# Patient Record
Sex: FEMALE | Race: WHITE | NOT HISPANIC OR LATINO | Employment: FULL TIME | URBAN - METROPOLITAN AREA
[De-identification: names, ages, dates, MRNs, and addresses within clinical notes are randomized per-mention and may not be internally consistent; named-entity substitution may affect disease eponyms.]

---

## 2022-02-07 ENCOUNTER — TRANSCRIBE ORDERS (OUTPATIENT)
Dept: MATERNAL FETAL MEDICINE | Facility: CLINIC | Age: 32
End: 2022-02-07

## 2022-02-07 DIAGNOSIS — O26.90 PREGNANCY RELATED CONDITION, ANTEPARTUM: Primary | ICD-10-CM

## 2022-02-08 DIAGNOSIS — M32.14 LUPUS NEPHRITIS (H): ICD-10-CM

## 2022-02-08 DIAGNOSIS — L93.0 LUPUS ERYTHEMATOSUS: Primary | ICD-10-CM

## 2022-03-20 ENCOUNTER — HEALTH MAINTENANCE LETTER (OUTPATIENT)
Age: 32
End: 2022-03-20

## 2022-03-29 ENCOUNTER — PRE VISIT (OUTPATIENT)
Dept: MATERNAL FETAL MEDICINE | Facility: CLINIC | Age: 32
End: 2022-03-29
Payer: OTHER GOVERNMENT

## 2022-04-04 NOTE — PROGRESS NOTES
Maternal-Fetal Medicine Consultation    Olivia Jacobson  : 1990  MRN: [id number]    REFERRAL:  Olivia Jacobson is a 31 year old sent by Dr. Grande from [facility] for MFM consultation.    HPI:  Olivia Jacobson is a 31 year old  at 20w0d by 6w2d US here for MFM consultation for systemic lupus erythematosus with class III lupus nephritis.  Also with history of hypertension, BMI 38.  Also with A1C 5.7% in early pregnancy and history of fatty liver disease.    She is here with her partner.    Patient with history of SLE for which she follows with Dr. Sandoval of rheumatology at [facility].  This was diagnosed in  with inflammatory arthritis in the knees, shoulders, elbow, and PIP joints.  Also with serositis, fatigue, weight loss.  Positive FROYLAN.  She was started on Plaquenil in 2012 but discontinued shortly thereafter for a pruritic rash and swelling of her lips and eyelids.  Skin biopsy revealed a drug reaction, not a lupus rash.  This eventually cleared with Prednisone.  Also trialed on Imuran.  2014 she developed microscopic hematuria and proteinuria, TP/creatinine ratio 2.7, , C3 75, C4 <8 (chronically low C4), normal creatinine. At that time she was also noting pleuritic symptoms, and chest x-ray showed new bilateral pleural effusions. Renal biopsy 2014 with local proliferative lupus glomerulonephritis (activity index 5/24, chronicity index 0/12).  Started on Cellcept 500 mg bid but stopped shortly thereafter due to elevated liver transaminases in the 300s.  She completed 6 months of IV Cytoxan on 2014 with Lupron prophylaxis, IV Solu-Medrol, and a prolonged prednisone taper.  She started Prograf 1 mg BID in 2014 and ultimately tapered off Prednisone.  Prograf was increased due to recurrent eyelid swelling.  She was previously on Lisinopril however this has since been discontinued and BP remains normotensive.    She is currently on  "Tacrolimus 2 mg BID and has been well controlled; states this medication has been the most effective for her.  Last flare many years ago.  Lupus anticoagulant, beta 2 glycoprotein 1 antibody, and anticardiolipin negative (see labs below).  Per rheumatology records, negative SSA SSB.  In 2020 she did have a positive anticardiolipin IgG of 64 but this was the only time this has been positive.  She denies history of blood clots.  She denies photosensitivity, rashes, ocular inflammation, oral ulcers, sicca symptoms, alopecia, chest pain, shortness of breath, gastrointestinal complaints, Raynaud's, joint pain, swelling or prolonged morning stiffness.      Of note, first pregnancy was an ectopic requiring treatment with methotrexate in 10/2020.  Second pregnancy with missed  at 12 weeks gestation requiring D&C.    Of note, patient's outside records have a line that state \" screening for raised alphafetoprotein level\" however patient is unaware of an elevated AFP.  AFP 0.99 MoM and negative screen upon obtaining records.    Prenatal Care:  Primary OB care this pregnancy has been with [facility].    Obstetrics History:  OB History    Para Term  AB Living   3 0 0 0 2 0   SAB IAB Ectopic Multiple Live Births   1 0 1 0 0      # Outcome Date GA Lbr [place]/2nd Weight Sex Delivery Anes PTL Lv   3 Current            2 SAB 21 9w0d          1 Ectopic 10/08/20               Gynecologic History:  - Menstrual history: Patient's last menstrual period was 2021.   - Last Pap: 2022 NILM  - Denies any history of abnormal pap smears  - Denies prior cervical surgery or procedures  - Denies any history of frequent UTIs, vaginal infections, or STIs    Past Medical History:  Past Medical History:   Diagnosis Date     Elevated hemoglobin A1c      Fatty liver      HTN (hypertension)      Lupus nephritis (H)      Obesity      Systemic lupus erythematosus (H)        Past " Surgical History:  Past Surgical History:   Procedure Laterality Date     DILATION AND CURETTAGE         Current Medications:  Current Outpatient Medications   Medication Instructions     aspirin (ASA) 81 mg, Oral, DAILY     Prenatal Vit-Fe Fumarate-FA (PRENATAL VITAMINS PO) 1 tablet, Oral     tacrolimus (ENVARSUS XR) 4 mg, Oral, EVERY MORNING BEFORE BREAKFAST     VITAMIN D, CHOLECALCIFEROL, PO 1,000 Units, Oral, DAILY     Allergies:  Aminoquinolines rash    Social History:   Denies use of alcohol, drugs or smoking.    Family History:  Mother and sister with asthma.  Maternal grandmother and aunt with breast cancer.  Maternal grandmother with rheumatoid arthritis.    ROS:  As per HPI, otherwise negative.    PHYSICAL EXAM:  Deferred     Ultrasound:  DATING  ---------------------------------------------------------------------------------------------------------                                           Date                                Details                                                                                      Gest. age                      EDITA  LMP                                  11/8/2021                                                                                                                         21 w + 1 d                     8/15/2022  Prior assessment               12/29/2021                       GA: 6 w + 2 d                                                                            20 w + 1 d                     8/22/2022  U/S                                   4/5/2022                          based upon AC, BPD, Femur, HC                                                 21 w + 0 d                     8/16/2022  Assigned dating                  Dating performed on 04/5/2022, based on the prior assessment (on 12/29/2021)                    20 w + 1 d                     8/22/2022        GENERAL  EVALUATION  ---------------------------------------------------------------------------------------------------------  Cardiac activity present.  bpm.  Fetal movements present.  Presentation cephalic.  Placenta Placental site: posterior. No Previa, > 2 cm from internal os.  Umbilical cord 3 vessel cord.  Amniotic fluid Amount of AF: normal. MVP 4.0 cm.        FETAL BIOMETRY  ---------------------------------------------------------------------------------------------------------  Main Fetal Biometry:  BPD                                        49.6                    mm                         21w 0d                Hadlock  OFD                                        62.6                    mm                         20w 1d                Nicolaides  HC                                          180.3                  mm                          20w 3d                Hadlock  Cerebellum tr                            20.6                   mm                          19w 4d                Nicolaides  AC                                          159.1                  mm                          21w 0d        73%        Hadlock  Femur                                      36.1                   mm                          21w 3d                Hadlock  Humerus                                  33.8                    mm                         21w 3d                Nyasia  Fetal Weight Calculation:  EFW                                       402                     g                                     91%        Hadlock  EFW (lb,oz)                             0 lb 14                 oz  EFW by                                        Hadlock (BPD-HC-AC-FL)  Head / Face / Neck Biometry:  CM                                          4.7                     mm  Nasal bone                               6.5                     mm  Nuchal fold                               4.6                     mm        FETAL  ANATOMY  ---------------------------------------------------------------------------------------------------------  The following structures appear normal:  Head / Neck                         Cranium. Head size. Head shape. Lateral ventricles. Choroid plexus. Midline falx. Cavum septi pellucidi. Cerebellum. Cisterna magna.                                             Parenchyma. Thalami. Vermis.                                             Neck. Nuchal fold.  Face                                   Lips. Profile. Nose. Maxilla. Mandible. Orbits. Lens.  Heart / Thorax                      4-chamber view. Situs. Aortic arch view. Bicaval view. Ductal arch view. Superior vena cava. Inferior vena cava. 3-vessel-trachea view. Cardiac                                             position. Cardiac size. Cardiac rhythm.                                             Right lung. Left lung. Diaphragm.  Abdomen                             Abdominal wall. Cord insertion. Stomach. Kidneys. Bladder. Liver. Bowel. Genitals.  Spine                                  Cervical spine. Thoracic spine. Lumbar spine. Sacral spine.  Extremities / Skeleton          Right arm. Right hand. Left arm. Left hand. Right leg. Right foot. Left leg. Left foot.     The following structures could not be adequately visualized:  Heart / Thorax                      RVOT view. LVOT view. 3-vessel view.     Gender: male.        MATERNAL STRUCTURES  ---------------------------------------------------------------------------------------------------------  Cervix                                  Visualized                                             Appearance: Appears Closed                                             Cervical length 40.3 mm  Right Ovary                          Visualized  Left Ovary                            Visualized    Labs:   11/2021:  Creatinine 0.75  BUN 9  Na 139  K 4.3  GFR > 60  TP/Creatinine ratio:  0.05  Total protein urine 8  Total  creatinine urine 174  AST 16  ALT 26    21:  PT 12.6  INR 1.0  PTT 27.5  Thrombin 17.2  DRVVT 0.97, no evidence of lupus like anticoagulant   Beta 2 glycoprotein IgG 1.4, IgM 3.3, negative  Cardiolipin IgG 3.1, IgM 1.8, negative    21:  TP/Creatinine:  0.03    21 TSH 2.71    2021:  Anti DNA 12.2  Anti centromere <1:40  Homogeneous pattern <1:640  C4 21 WNL  C3 154 WNL  Anti SM <20  Anti RNP <20  Anti RO <20  Anti Scl 70 <20  Anti LA <20  Creatinine 0.58  Ast 10  Alt 13    2020:  Anti nuclear Ab negative  Anti-SM <20 negative  Anti RNP <20 negative  Anti RO <20 negative  Anti LA <20 negative  Anti Scl 70 <20 negative  Anti centromere <1:40  C3 169 elevated  C4 20 WNL  Anti thyroid microsomal antibody <9.0  Anti cardiolipin IgG 64 elevated  Anti cardiolipin IgA <12  Anti cardiolipin IgM <13  Anti chromatin 64 elevated  Anti DNA <8.0 negative  RF <15 negative  Anti CCP <20 negative  Homogeneous pattern <1:40    2019: Negative lupus anticoagulant, beta 2 glycoprotein 1 antibodies and anticardiolipin antibodies.       ASSESSMENT/PLAN:  Olivia Jacobson is a 31 year old  at 20w0d by 6w2d US here for MFM consultation for systemic lupus erythematosus with class III lupus nephritis.  Also with history of hypertension (now normotensive off of medication), fatty liver disease (recent normal LFTs), BMI 38.    Systemic lupus erythematosus  Class III lupus nephritis   The prognosis for both mother and child is best when SLE has been quiescent for at least 6 months prior to the pregnancy, and the patient's underlying renal function is stable and normal or near normal. The major issues with SLE are exacerbation of the disease, superimposed preeclampsia, fetal growth restriction, fetal loss, and  lupus. There may be a slight increase for flares during pregnancy, although the data are conflicting.   There is an increased risk of a lupus flare in the immediate postpartum period.       Patients with lupus nephritis require increased maternal and fetal surveillance as morbidity is greatly increased. Notably, the fetal loss rate may be as high as 50% if the baseline serum creatinine is >1.5.  The prognosis is favorable for nephritis patients if the disease is quiescent for 6 months, the serum creatinine is <1.5, proteinuria is <3g in a 24-hour collection, and the patient s blood pressure is well controlled.  However, 25% of these patients may develop renal deterioration and 8.5% may experience permanent renal damage.  These patients are at an increased risk for preeclampsia, which can be difficult to diagnose if there is chronic proteinuria and/or hypertension.    Another major issue is related to co-existing antibodies which lead to specific complications.  Antibodies to Ro and La are associated with fetal heart block, and antibodies associated with the antiphospholipid antibody syndrome (APS) are associated with poor obstetric outcomes and thrombosis.    It is sometimes difficult to differentiate an SLE flare from preeclampsia therefore baseline testing is important.      Data from several observational studies suggest that antimalarial drugs, such as hydroxycholorquine (Plaquenil), are safe and there is significant experience with their use in pregnancy. Glucocorticoids are also relatively safe though they are associated with hypertension, diabetes and  premature rupture of membranes. Importantly, SLE flares themselves are associated with poor outcome, and the risk of the medications is thought to be less than the risk of a flare itself.      Tacrolimus crosses the placenta and reported associations have included pregnancy loss,  delivery, low birth weight, birth defects (cardiac, neurologic, craniofacial, skeletal, renal),  renal dysfunction, and fetal distress. The limitations of knowing the exact risk include the fact that mother taking this medication often take  concomitant medications, and they typically have underlying medical concerns. There is not a causal relationship that has been found.  Small series have shown improved disease maintenance, thus the benefit is likely greater than any potential risk to the medication.     Recommendations:    Continue Tacrolimus with monthly level surveillance by rheumatologist     Low dose aspirin for pre-eclampsia prevention  - ECG, with echocardiogram if abnormal  - Thyroid function (TSH, reflex to free T4)    Previous negative workup for SSA, SSB, APAS with rheumatology     Serial ultrasound for fetal growth after 24 weeks    Frequent visits to assess blood pressure and urine protein    Weekly  testing with BPP (or NST and MVP) starting at 32 weeks    Delivery by 39-40 weeks    Stress dose steroids during labor if chronic steroids are necessary during the pregnancy.    Continue care with her rheumatologist, Dr. Sandoval    Chronic hypertension  Patients with chronic hypertension are more likely to develop preeclampsia during the pregnancy, with a risk of 20-50%.  Women with chronic hypertension are at increased risk for early-onset preeclampsia.  Low dose aspirin has been used to lower this risk.  Chronic hypertension also increases the risk of maternal stroke, pulmonary edema, renal failure, gestational diabetes, iatrogenic  birth, fetal growth restriction, placental abruption and  mortality rate including stillbirth    Without baseline laboratory assessment, it may be difficult to distinguish an exacerbation of hypertension from preeclampsia, especially in the third trimester. We reviewed that it is generally anticipated that blood pressure will gradually decrease during early pregnancy, reaching at speedy at 28-32 weeks, and then slowly rise to pre-pregnancy levels.     Guidelines suggest starting antihypertensive medication in women with chronic hypertension if the systolic blood pressure is persistently  160 mmHg or higher or the diastolic blood pressure is persistently 110 mmHg or higher.  If there is evidence of end organ damage (renal insufficiency, left ventricular hypertrophy or severe thrombocytopenia) a lower threshold of 150 mmHg systolic and/or 100 mm Hg diastolic is recommended. Treatment with antihypertensives is generally used to maintain blood pressure in the general range of 120-159 mmHg systolic and  mmHg diastolic. Lower blood pressures may increase the risk of fetal growth restriction. The primary reason for antihypertensive is to reduce the risk of maternal stroke. Recommended antihypertensives with studied safety profiles in pregnancy include nifedipine and labetalol. Unfortunately, even exquisite control of blood pressure does not reduce the risk of superimposed preeclampsia.     Recommendations:    Initiate necessary medications and titrate as needed to achieve blood pressure goal    Low dose aspirin for preeclampsia prevention, ideally started between 12-16 weeks    Close monitoring evidence of superimposed preeclampsia  o More frequent blood pressure monitoring; she should check her blood pressure at least weekly until 28 weeks at which point she should check it at least two times per week; if elevated or medication adjustments are needed, it should be taken more frequently  o Her blood pressure cuff should be titrated in the office  o More frequent prenatal visits are indicated with hypertension when medication modifications are necessary  o Calling parameters should be clearly laid out with regards to blood pressure and symptoms  o Repeat labs as clinically indicated, with a low threshold to rule-out superimposed preeclampsia, especially if it is thought that medication may need to be increased    Comprehensive (level II) anatomic ultrasound    Ultrasounds for growth monthly after comprehensive ultrasound     fetal surveillance with weekly BPP (or NST and MVP) starting at 32  weeks     Delivery at 37-39 weeks (unless poorly controlled or otherwise indicated sooner)    Early postpartum visit (within the first week) for blood pressure assessment    Postpartum, medication should be adjusted to maintain the systolic blood pressure below 150 mm Hg and the diastolic blood pressure below 100 mm Hg    Long-term care with primary care/cardiology    Postpartum lifestyle modifications if appropriate (e.g. if concurrent obesity/sedentary lifestyle)    Obesity  Obesity during pregnancy is associated with hypertension, preeclampsia, diabetes, venous thromboembolism, fetal macrosomia,  delivery, and complications of  delivery (excessive blood loss, infection, wound separation).  Obesity is associated with an increased risk of fetal anomalies, specifically neural tube defects.  This is further complicated by the decreased ability to detect malformations in these fetuses due to imaging limitations of maternal body habitus.  For women with class III obesity, detection of anomalies by targeted (level II) ultrasound is about 75% (as compared to 97% in women with a normal BMI).  By comparison, routine (level I) ultrasound would be expected to detect only 22% of anomalies in the class as compared to 66% of those with a normal weight.  Fundal heights may not be helpful in determining if there is appropriate fetal growth.  Studies suggest that pre-pregnancy obesity increases the risk for stillbirth, though the etiology is not known.  Pregnant women have an 11-20% incidence of obstructive sleep apnea, which is 13 times more common in women with obesity.  ENRRIQUE in pregnancy is associated with increased  morbidity and mortality, both fetal/ as well as maternal    There are a number of intrapartum considerations for obese women.  Studies have found that obese women have, on average a longer first stage of labor.   Obese women attempting a trial of labor after  are less likely  to be successful.  Pre-pregnancy obesity increases the rate of both emergent and scheduled  delivery.  Furthermore, obese women are more likely to have complications of  delivery.  Specifically, in obese women there are prolonged operative times which can delay an emergent delivery; they are more likely to have blood loss in excess of 1000 mL, wound infection/separation and endometritis.  There may be anesthesia concerns related to the obese  as well, such as a higher spinal failure rate and more difficult intubation.  Since obese women are more likely to have a macrosomic  they are more likely to have associated complications such as shoulder dystocia and obstetric sphincter injury.  There is also evidence that obese women are more likely to have prolonged postpartum hospitalizations and there is an increased risk for thromboembolism.      It is not recommended that obese women pursue weight loss during pregnancy but rather that they limit their weight gain.  Counseling by a nutritionist may be considered.  Screening for diabetes should be undertaken in the first trimester and then again at 26-28 weeks if the first testing is normal.  It is currently recommended that all pregnant women engage in 30 minutes of exercise most days of the week.  Additionally, postpartum weight loss and exercise should be emphasized as part of the lifestyle modifications that can alter morbidity and mortality.    Recommendations:    Screen for obstructive sleep apnea, using a questionnaire such as the STOP    Weight gain should be limited to <11-20 lbs    Counseling by a nutritionist may be considered.     Glucose testing with GCT should be performed as soon as possible when pregnancy is diagnosed; If the result is normal, the test should be repeated again at 26-28 weeks.    Targeted anatomical survey at 18 weeks.    Weekly  testing with BPP (or NST and MVP) starting at 34 weeks gestation, for  women with a BMI ? 40 and 37 weeks gestation, for women with a BMI ? 35.    Depending on BMI, consult anesthesiology on admission, or antepartum, depending on comorbidities, even if the patient is not planning to use anesthesia for labor, so they can assess her prior to an obstetric emergency    Induction of labor should be offered at 39 weeks gestation to women with a BMI ? 40, and can be considered at this GA for those with a BMI 30-39.      Use hospital available VTE risk stratification to determine need for anticoagulation (both for antepartum and postpartum admissions).  Briefly:  o All patients with a current BMI ? 40 should have pharmacologic thromboprophylaxis within 24 hours of their delivery for the course of their hospitalization regardless of the mode of delivery  o All patients with a current BMI ? 35 should have pharmacologic thromboprophylaxis within 24 hours of their delivery for the course of their hospitalization if they have a  delivery OR any other significant risk factor (including but not limited to SLE, diabetes with renal/cardiac disease, AMA 40+, QBL > 1000 mL, general anesthesia, hypertensive disorder, FGR, systemic infection, multiple gestation, tobacco, prolonged immobility > 24 hours).    o The same criteria apply to antepartum admissions; all patients with a current BMI ? 40 and all those with a current BMI ? 35 plus an additional risk factor    Encourage breastfeeding    Postpartum lifestyle modification      The patient was seen and evaluated with Dr. Nicolas    [facility] in 4 weeks for follow up comprehensive sono (limited views).    Thank you for allowing us to participate in the care of your patient. Please do not hesitate to contact us if you have further questions regarding the management of your patient.     Caroline Mayes MD   Maternal-Fetal Medicine Fellow, PGY5  2022 10:03 AM      Physician Attestation   I, Mode Nicolas MD, saw this patient and agree with the  findings and plan of care as documented in the note.      Items personally reviewed/procedural attestation: History and counseling. Total time spent in face-to-face counseling was 15 minutes (>50% for medical management discussion  And plan of care). A copy of this consult was sent to your office.     Mode Nicolas MD

## 2022-04-05 ENCOUNTER — HOSPITAL ENCOUNTER (OUTPATIENT)
Dept: ULTRASOUND IMAGING | Facility: CLINIC | Age: 32
Discharge: HOME | End: 2022-04-05
Attending: OBSTETRICS & GYNECOLOGY
Payer: OTHER GOVERNMENT

## 2022-04-05 ENCOUNTER — OFFICE VISIT (OUTPATIENT)
Dept: MATERNAL FETAL MEDICINE | Facility: CLINIC | Age: 32
End: 2022-04-05
Attending: OBSTETRICS & GYNECOLOGY
Payer: OTHER GOVERNMENT

## 2022-04-05 DIAGNOSIS — Z14.8 GENETIC CARRIER STATUS: Primary | ICD-10-CM

## 2022-04-05 DIAGNOSIS — L93.0 LUPUS ERYTHEMATOSUS: ICD-10-CM

## 2022-04-05 DIAGNOSIS — M32.14 LUPUS NEPHRITIS (H): ICD-10-CM

## 2022-04-05 DIAGNOSIS — O26.90 PREGNANCY RELATED CONDITION, ANTEPARTUM: ICD-10-CM

## 2022-04-05 DIAGNOSIS — D68.62 LUPUS ANTICOAGULANT AFFECTING PREGNANCY IN SECOND TRIMESTER, ANTEPARTUM (H): Primary | ICD-10-CM

## 2022-04-05 DIAGNOSIS — O99.112 LUPUS ANTICOAGULANT AFFECTING PREGNANCY IN SECOND TRIMESTER, ANTEPARTUM (H): Primary | ICD-10-CM

## 2022-04-05 DIAGNOSIS — Z36.9 ANTENATAL SCREENING ENCOUNTER: ICD-10-CM

## 2022-04-05 PROCEDURE — 96040 HC GENETIC COUNSELING, EACH 30 MINUTES: CPT

## 2022-04-05 PROCEDURE — 76811 OB US DETAILED SNGL FETUS: CPT | Mod: 26 | Performed by: OBSTETRICS & GYNECOLOGY

## 2022-04-05 PROCEDURE — G0463 HOSPITAL OUTPT CLINIC VISIT: HCPCS | Mod: 25 | Performed by: OBSTETRICS & GYNECOLOGY

## 2022-04-05 PROCEDURE — 76811 OB US DETAILED SNGL FETUS: CPT

## 2022-04-05 PROCEDURE — 99202 OFFICE O/P NEW SF 15 MIN: CPT | Mod: 25 | Performed by: OBSTETRICS & GYNECOLOGY

## 2022-04-05 RX ORDER — ASPIRIN 81 MG/1
81 TABLET, CHEWABLE ORAL DAILY
Status: ON HOLD | COMMUNITY
End: 2022-08-14

## 2022-04-05 NOTE — PROGRESS NOTES
"[facility] [place]  Genetic Counseling Consult    Patient: Olivia Jacobson YOB: 1990       Olivia \"Rubi\" MATTHEW Jacobson was seen at the [facility] [place] for genetic consultation as part of her appointment for comprehensive ultrasound.  Olivia was accompanied to the appointment today by her partner, Aldo.         Impression/Plan:   1. 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 She and her partner Aldo declined carrier screening for Aldo at this time. They are aware this option will remain available if they are interested at any point.     2. Rubi had a cell-free fetal DNA test earlier in pregnancy, which was normal.    3. Rubi had a comprehensive (level II) ultrasound today.  Please see the ultrasound report for further details.    4. Rubi also had a consultation with [facility] physician today due to her history of systemic lupus erythematous. Please see Dr. Nicolas's note for details.      Pregnancy History:   /Parity:    Age at Delivery: 32 year old  EDITA: 2022, by Ultrasound  Gestational Age: 20w1d    No significant complications or exposures were reported in the current pregnancy.    Rubi s pregnancy history is significant for:  o 10/2020: Ectopic pregnancy, ruptured tube treated with salpingectomy  o 2021: SAB, 9w0d, s/p D&C    Medical History:   Rubi's medical history is significant for systemic lupus erythematous, for which she currently takes tacrolimus 2 mg BID. She was diagnosed in  and follows with Dr. Costa at [facility]. She was last seen by Dr. Costa on 12/15/2021 and has a follow-up appointment scheduled 2022. Rubi states that her joint symptoms have been under good control during the pregnancy. Please see Dr. Estrada's note for additional details and recommendations.        Family History:   A three-generation pedigree was obtained, and is scanned under the "  Media  tab.   The following significant findings were reported by Rubi and Aldo:    The father of the pregnancy, Aldo, is 32 and healthy.    Aldo reports that his paternal uncle has autism. It was reported that he has no other health concerns or physical differences.   o Autism is a spectrum of developmental disorders characterized by impaired social interaction, problems with verbal and nonverbal communication, and unusual, repetitive, or severely limited activities and interests.  Autism is a heterogeneous disorder, including genetic and non-genetic causes. In a small percentage of individuals with ASD, it is a feature of a genetic condition such as Down syndrome, fragile X syndrome, or Rett syndrome. However, in most isolated cases the cause may be multifactorial, meaning a combination of genetic and environmental factors. Given that this is a third degree relative to the pregnancy, risk of autism spectrum disorders may be slightly increased over that of the general population for Rubi's pregnancy. Prenatal testing to determine risk of autism is not available. Recommended discussing this family history with their pediatrician to ensure appropriate screening for autism spectrum disorders.     Aldo reports that a maternal cousin had breast cancer at age 45 and a maternal aunt had breast cancer at age 65. Rubi reports that a maternal aunt passed away from breast cancer at age 45. Additionally, Rubi's father was recently diagnosed with melanoma on his torso at age 61 and just completed treatment. Rubi has been advised to schedule a full body skin exam due to this history and plans to do this in the near future.   o We briefly discussed the family history of cancer. We discussed that most cancer seen in families occurs sporadically, but about 5-10% may be due to an underlying genetic etiology. We discussed that breast cancer before age 50 is rare and can be associated with inherited cancer predisposition  "syndromes. Genetic counseling is available for cancer syndromes. Cancer family history, even without genetic testing, can change cancer screening recommendations for family members and aid in insurance coverage for access to them as well. The most informative individuals to complete cancer genetic counseling and genetic testing are those with a personal history of cancer or those closely related to the affected individuals. If the family wants more information they can contact the [facility] Cancer Risk Management Program ([phone]). Physicians can also make referrals at [email or web address] or, if within the [place] system, through [facility] referral for \"Cancer Risk Mgmt/Cancer Genetic Counseling\"   o Also discussed that individuals with a first degree relative with melanoma may be at increased risk of melanoma. Encouraged Rubi to schedule a skin exam.     Otherwise, the reported family history is negative for multiple miscarriages, stillbirths, birth defects, intellectual disability, known genetic conditions, and consanguinity.       Carrier Screening:   The patient reports that she and the father of the pregnancy are of  ancestry:     Cystic fibrosis is an autosomal recessive genetic condition that occurs with increased frequency in individuals of  ancestry and carrier screening for this condition is available.  In addition,  screening in the [facility] includes cystic fibrosis.    The father of the pregnancy has a small amount of Mediterranean ancestry:     The hemoglobinopathies are a group of genetic blood diseases that occur with increased frequency in individuals of Mediterranean ancestry and carrier screening for these conditions is available. In addition,  screening in the [facility] includes many of the hemoglobinopathies.      Expanded carrier screening for mutations in a large panel of genes " associated with autosomal recessive conditions including cystic fibrosis, spinal muscular atrophy, and others, is now available.      Rubi had carrier screening for 51 genes through [employer] earlier in the pregnancy. This was positive for a pathogenic variant in alpha-1 antitrypsin deficiency (SERPINA1 c.1096G>A ([id number]), also known as the Z allele). Her carrier screen was negative for the remainder of the genes on the panel.    Alpha-1-antitrypsin deficiency is a condition inherited in an autosomal codominant fashion that can cause lung disease (COPD and emphysema) and liver disease. It is caused by variations in the genetic sequence of the gene SERPINA1 (on chromosome 14). There are a number of possible genotypes which ultimately contribute to the severity of the disorder in individuals: Z/Z being the most severe due to the lack off alpha-1-antitrypsin made by the body; S/S being a moderate form of the disease due to some production of alpha-1-antitrypsin; M/M unaffected status due to wild-type amount of alpha-1-antitrypsin produced. There are also a variety of combinations for these allele types that can result in varying degrees of clinical outcomes (ie. Individuals with S/Z or M/S).    We reviewed availability of carrier screening for Aldo to get more information about the chance that the current pregnancy is affected with this condition. Rubi and Aldo shared that they have had a chance to read about this condition and have decided that they don't feel that it is necessary to pursue carrier screening for Aldo for this condition at this time. They were encouraged to share Rubi's carrier status with their child's pediatrician.       Risk Assessment:       Olivia had maternal serum screening earlier in pregnancy.     Non-invasive Prenatal Testing (NIPT)    Maternal plasma cell-free DNA testing    Screens for fetal trisomy 21, trisomy 13, trisomy 18, and sex chromosome aneuploidy    First trimester  ultrasound with nuchal translucency and nasal bone assessment was not performed in this pregnancy, to our knowledge.    Olivia had an [employer] NIPS test earlier in pregnancy; we reviewed the results today, which are normal for chromosome 13, chromosome 18, chromosome 21, and sex chromosomes (no aneuploidy detected)    Given the accuracy of this test, these results greatly decrease the chance for certain fetal chromosome abnormalities    We discussed the limitations of normal NIPT results    MSAFP (after 15 weeks for open neural tube defect screening) results were not available for our review today.       Testing Options:   We discussed the following options:   Genetic Amniocentesis    Invasive procedure typically performed in the second trimester by which amniotic fluid is obtained for the purpose of chromosome analysis and/or other prenatal genetic analysis    Diagnostic results; >99% sensitivity for fetal chromosome abnormalities    AFAFP measurement tests for open neural tube defects     Comprehensive (Level II) ultrasound: Detailed ultrasound performed between 18-22 weeks gestation to screen for major birth defects and markers for aneuploidy.    We reviewed the benefits and limitations of this testing.  Screening tests provide a risk assessment specific to the pregnancy for certain fetal chromosome abnormalities, but cannot definitively diagnose or exclude a fetal chromosome abnormality.  Follow-up genetic counseling and consideration of diagnostic testing is recommended with any abnormal screening result.     Diagnostic tests carry inherent risks- including risk of miscarriage- that require careful consideration.  These tests can detect fetal chromosome abnormalities with greater than 99% certainty.  Results can be compromised by maternal cell contamination or mosaicism, and are limited by the resolution of cytogenetic G-banding technology.  There is no screening nor diagnostic test that can detect all forms of  birth defects or mental disability.     It was a pleasure to be involved with Olivia s care. Face-to-face time of the meeting was 30 minutes.    Felecia Fox [facility] [place] [facility], [facility]  Licensed Genetic Counselor  [facility]  Maternal Fetal Medicine  Phone: [phone]  [email or web address]

## 2022-04-05 NOTE — NURSING NOTE
Rubi here for GC, comp U/S and MFM consult due to preg c/b Lupus and elevated BMI. Dr. Nicolas and Dr. Mayes in to see pt.  See consult note. Pt left amb and stable. Amaya Marx RN

## 2022-04-18 ENCOUNTER — HOME INFUSION (PRE-WILLOW HOME INFUSION) (OUTPATIENT)
Dept: PHARMACY | Facility: CLINIC | Age: 32
End: 2022-04-18
Payer: OTHER GOVERNMENT

## 2022-04-19 NOTE — PROGRESS NOTES
This is a recent snapshot of the patient's [facility] medical record.  For current drug dose and complete information and questions, call [phone] or In [facility] pool, fv home infusion ([id number])  CSN Number:  [id number]

## 2022-04-25 ENCOUNTER — MYC MEDICAL ADVICE (OUTPATIENT)
Dept: FAMILY MEDICINE | Facility: CLINIC | Age: 32
End: 2022-04-25

## 2022-04-25 ENCOUNTER — VIRTUAL VISIT (OUTPATIENT)
Dept: FAMILY MEDICINE | Facility: CLINIC | Age: 32
End: 2022-04-25
Payer: OTHER GOVERNMENT

## 2022-04-25 DIAGNOSIS — U07.1 INFECTION DUE TO 2019 NOVEL CORONAVIRUS: Primary | ICD-10-CM

## 2022-04-25 DIAGNOSIS — R05.9 COUGH: ICD-10-CM

## 2022-04-25 PROCEDURE — 99203 OFFICE O/P NEW LOW 30 MIN: CPT | Mod: TEL | Performed by: PHYSICIAN ASSISTANT

## 2022-04-25 RX ORDER — PREDNISONE 20 MG/1
40 TABLET ORAL DAILY
Qty: 10 TABLET | Refills: 0 | Status: SHIPPED | OUTPATIENT
Start: 2022-04-25 | End: 2022-04-30

## 2022-04-25 NOTE — PROGRESS NOTES
Rubi is a 31 year old who is being evaluated via a billable telephone visit.      How would you like to obtain your AVS? MyChart  If the video visit is dropped, the invitation should be resent by: Text to cell phone: [phone]  Will anyone else be joining your video visit? No    Video Start Time: 4:08 PM    Assessment & Plan     Infection due to 2019 novel coronavirus  Cough  Patient is a 31-year-old female with past medical history significant for fatty liver, lupus nephritis, SLE, is currently 23 weeks pregnant, who presents to clinic due to COVID-19 symptoms starting approximately 1 week ago with positive test at that time.  Patient was treated with monoclonal antibodies.  She notes persistent symptoms including dry cough that comes in fits and wakes her up at night.  Given medical history and COVID-19 diagnosis, considered PE, but patient denies chest pain outside of coughing, dyspnea on exertion, dyspnea at rest, and hemoptysis.  She has been compliant with 81 mg aspirin daily.  Discussed treatment for cough and recommended starting with guaifenesin.  Patient will  over-the-counter.  If symptoms are not improving with medicine, patient prescribed prednisone.  Discussed symptoms that warrant urgent/emergent follow-up as well as return precautions.  - predniSONE (DELTASONE) 20 MG tablet; Take 2 tablets (40 mg) by mouth daily for 5 days     See Patient Instructions    No follow-ups on file.    Ashanti Jamil PA-C  [facility] MEL Venegas is a 31 year old who presents for the following health issues     HPI       COVID-19 Symptom Review  How many days ago did these symptoms start? Positive Covid19 test completed 4/18, sx began that same day.  Patient received monoclonal antibody treatment the next day.  Symptoms have not really improved since last week. Patient notes persistent cough, coughing attacks and chest pain that is only with cough. No wheezing or dyspnea. No  hemoptysis. Patient takes 81mg ASA during pregnancy. No history of PE/DVT.     Are any of the following symptoms significant for you?  New or worsening difficulty breathing? Yes    Please describe what kind of difficulty you are having breathing:Mild dyspnea (able to do ADLs without difficulty, mild shortness of breath with activities such as climbing one or two flights of stairs or walking briskly)    Worsening cough? Yes, I am coughing up mucus-yellow-white    Fever or chills? No    Headache: no    Sore throat: YES    Chest pain: YES    Diarrhea: no    Body aches? no    What treatments has patient tried? None   Does patient live in a nursing home, group home, or shelter? no  Does patient have a way to get food/medications during quarantined? Yes, I have a friend or family member who can help me.         MASSBP Score 4/25/2022   Age Greater than or equal to 65 years 0   BMI greater than or equal to 35 kg/m2 2   Has Diabetes Mellitus 0   Has Chronic Kidney Disease 3   Has Cardiovascular Disease and 55 years or older 0   Has Chronic Respiratory Disease and 55 years or older 0   Has Hypertension and 55 years or older 0   Is Immunocompromised 4   Is Pregnant 4   Member of [employer] community (Black/, /, ,  or , or  or [place] Native)  0   MASSBP Score 13   Has the patient had a positive COVID test outside our system?  Yes   What day did symptoms start?  4/18/2022             Objective           Vitals:  No vitals were obtained today due to virtual visit.    Physical Exam   GENERAL: Healthy, alert and no distress  EYES: Eyes grossly normal to inspection.  No discharge or erythema, or obvious scleral/conjunctival abnormalities.  RESP: Intermittent dry cough.  Able to speak in full sentences.  SKIN: Visible skin clear. No significant rash, abnormal pigmentation or lesions.  NEURO: Cranial nerves grossly intact.  Mentation and speech appropriate  for age.  PSYCH: Mentation appears normal, affect normal/bright, judgement and insight intact, normal speech and appearance well-groomed.          Video-Visit Details    Type of service:  Telephone Visit    Video End Time:4:28 PM    Originating Location (pt. Location): Home    Distant Location (provider location):  [facility]     Platform used for Video Visit: Unable to complete video visit

## 2022-04-25 NOTE — PATIENT INSTRUCTIONS
To help with cough y I would recommend starting with over-the-counter guaifenesin.  If your symptoms do not improve over the next few days, you can start prescribed prednisone.  Both of these medications are safe to use in pregnancy.  As discussed, if you develop any severe symptoms such as chest pain outside of coughing, shortness of breath, shortness of breath with activity, high fevers, or coughing up blood, you need to be seen in the emergency department right away.  Please reach out with questions or concerns.  Discharge Instructions for COVID-19 Patients  You were tested for COVID-19. Your result was positive. This means you do have COVID-19 (coronavirus). Follow the instructions below. If you were tested for an upcoming procedure, you should also contact your provider for next steps.   Is there medicine to treat COVID-19?  Yes, there are two kinds of treatment: Antiviral (virus-killing) medicine and antibody treatment (called monoclonal antibodies). These have been proven safe and effective. They may make you feel better faster, keep you out of the hospital, and prevent death.   It's very important to take them early in your illness before you get worse.   Who should take this medicine?   These treatments are for people who are not in the hospital, but they're at risk of getting very sick from COVID. This includes people who:  Are over age 65  Are members of the BIPOC community (black, indigenous and people of color)  Are overweight (body mass index is over 25)  Are inactive (don't exercise)  Are pregnant  Smoke or vape (now or in the past)  Have a disability  Have any of the following health problems: Diabetes, high blood pressure, cancer, heart problems, liver disease, lung disease, kidney disease, sickle cell disease, cystic fibrosis (CF), dementia and other neurological (brain) diseases, HIV, thalassemia or tuberculosis  Have ever had a stroke, organ transplant or blood cell transplant  Have a mental  "health problem or substance abuse disorder (drugs, alcohol)  Have a weak immune system (are immuno-compromised)  If your risk is high, we strongly urge you to get treated as soon as possible.   If symptoms started in the last 5 days: You may qualify for pills (antiviral medicines like Paxlovid and molnupiravir). To schedule an appointment to discuss COVID-19 treatment, you can:  Call your family clinic. Or, dial [phone] ([phone]) and press [phone].  Go to [email or web address] (click \"Schedule your appointment\").  Request an appointment on [employer] (select COVID-19 Treatment\").  If your symptoms started in the last 7 days: You may qualify for antibody shots. Fill out a request form at [email or web address]. (If you don't read and write in English, or you need help with the form, call your family clinic.) Not everyone is chosen for this treatment. If you're selected, someone will contact you within 1 to 2 business days.  How can I take care of myself at home?  Get lots of rest.  Drink extra fluids (unless a doctor has told you not to).  Take acetaminophen (Tylenol) for fever or pain. (If you have liver or kidney problems, first ask your family doctor if it's safe to take acetaminophen.  Adults may take either:  650 mg acetaminophen (two 325 mg pills) every 4 to 6 hours as needed (but no more than 10 pills per day), or   1,000 mg acetaminophen (two 500 mg pills) every 6 hours as needed (but no more than 6 pills per day).  Note: Don't take more than 3,000 mg of acetaminophen (Tylenol) in one day. Acetaminophen is found in many medicines (both prescribed and over-the-counter medicines). Read all labels to be sure you don't take too much.  For children:  Check the acetaminophen (Tylenol) bottle to find out the right dose (based on their age or weight.)  Don't give children more than1,625 mg of acetaminophen in one day.  Know when to call 911. Emergency warning signs " include:  Trouble breathing or shortness of breath  Pain or pressure in the chest that doesn't go away  Feeling confused like you haven't felt before, or not being able to wake up  Bluish-colored lips or face  If you have other health problems (like cancer, heart failure, an organ transplant or severe kidney disease): Call your specialty clinic if you don't feel better in the next 2 days.  How can I protect others?  If you DO have symptoms:  Stay home and away from others (self-isolate):  For at least 5 days after your symptoms started, AND UNTIL  You've had no fever for 24 hours--with no medicine that reduces fever, AND  Your other symptoms (such as a cough) are better.  Wear a mask or face covering for 10 full days anytime you're around other people.  If you DON'T have symptoms:  Stay at home and away from others   for at least 5 days after your positive test.  Wear a mask or face covering for 10 full days anytime you're around other people.  If you plan to visit a clinic or hospital, please check their visitor guidelines before you arrive--healthcare sites may have different rules. If you were tested because you're going to have surgery or another procedure, contact your care team for next steps.  If you were severely ill from COVID-19 or have a weak immune system: stay at home and away from others for at least 10 days. Ask your doctor about other actions you should take.   During self-isolation  Stay home until it's safe to be around others.  At home, stay away from other people and pets. Or, wear a well-fitting mask when you need to be around others.  Monitor your symptoms. If you have any emergency warning signs (including trouble breathing), seek emergency medical care right away.  Stay in a separate room from other household members, if possible.  Use a separate bathroom, if possible.  Improve ventilation at home, if possible.  Don't share personal household items, like cups, towels, and utensils.  When can  I go back to work?  You should NOT go back to work until you meet the guidelines above for ending your home isolation. You don't need to be re-tested for COVID-19 before going back to work. Studies show that you won't spread the virus if it's been at least   10 days since your symptoms started (or 20 days if you have a weak immune system).  Employers, schools and daycares: This document serves as formal notice of medical guidelines before your employee or student can return to work or school. They must meet the above guidelines before going back in person.   Where can I get more information?  [facility] - About COVID-19:   [email or web address]  [facility] - What to Do If You're Sick:   www.cdc.gov/coronavirus/2019-ncov/about/steps-when-sick.html  CDC - Ending Home Isolation:   www.cdc.gov/coronavirus/2019-ncov/your-health/quarantine-isolation.html  [facility] Clinical trials (COVID-19 research studies):  clinicalaffairs.[facility]-clinical-trials    For informational purposes only. Not to replace the advice of your health care provider. Clinically reviewed by Dr. Drew Mills.   Copyright   2020 [facility]. All rights reserved. [employer] 903230 - REV 03/10/22.

## 2022-04-25 NOTE — LETTER
April 25, 2022      Olivia Jacobson  [address] [address]        To Whom It May Concern:    Olivia Jacobson was seen in our clinic. She may return to work 5/2/2022 without restrictions.      Sincerely,        Ashanti Jamil PA-C

## 2022-04-29 ENCOUNTER — E-VISIT (OUTPATIENT)
Dept: FAMILY MEDICINE | Facility: CLINIC | Age: 32
End: 2022-04-29
Payer: OTHER GOVERNMENT

## 2022-04-29 DIAGNOSIS — R05.9 COUGH: ICD-10-CM

## 2022-04-29 DIAGNOSIS — U07.1 INFECTION DUE TO 2019 NOVEL CORONAVIRUS: ICD-10-CM

## 2022-04-29 PROCEDURE — 99207 PR NON-BILLABLE SERV PER CHARTING: CPT | Performed by: PHYSICIAN ASSISTANT

## 2022-05-02 NOTE — PATIENT INSTRUCTIONS
Dear Olivia Jacobson,    We are sorry you are not feeling well. Based on the responses you provided, it is recommended that you be seen in-person in urgent care so we can better evaluate your symptoms.  If you have any severe symptoms such as chest pain, difficulty breathing, or coughing up blood, that you need to go to the emergency department.  Please click here to find the nearest urgent care location to you.   You will not be charged for this Visit. Thank you for trusting us with your care.    Ashanti Jamil PA-C

## 2022-05-04 ENCOUNTER — HOSPITAL ENCOUNTER (OUTPATIENT)
Dept: ULTRASOUND IMAGING | Facility: CLINIC | Age: 32
Discharge: HOME | End: 2022-05-04
Attending: OBSTETRICS & GYNECOLOGY
Payer: OTHER GOVERNMENT

## 2022-05-04 ENCOUNTER — OFFICE VISIT (OUTPATIENT)
Dept: MATERNAL FETAL MEDICINE | Facility: CLINIC | Age: 32
End: 2022-05-04
Attending: OBSTETRICS & GYNECOLOGY
Payer: OTHER GOVERNMENT

## 2022-05-04 DIAGNOSIS — O10.919 CHRONIC HYPERTENSION IN PREGNANCY: ICD-10-CM

## 2022-05-04 DIAGNOSIS — M32.9 SYSTEMIC LUPUS ERYTHEMATOSUS AFFECTING PREGNANCY IN SECOND TRIMESTER (H): Primary | ICD-10-CM

## 2022-05-04 DIAGNOSIS — M32.14 LUPUS NEPHRITIS (H): ICD-10-CM

## 2022-05-04 DIAGNOSIS — O99.891 SYSTEMIC LUPUS ERYTHEMATOSUS AFFECTING PREGNANCY IN SECOND TRIMESTER (H): Primary | ICD-10-CM

## 2022-05-04 PROCEDURE — 76816 OB US FOLLOW-UP PER FETUS: CPT | Mod: 26 | Performed by: OBSTETRICS & GYNECOLOGY

## 2022-05-04 PROCEDURE — 76816 OB US FOLLOW-UP PER FETUS: CPT

## 2022-08-12 ENCOUNTER — HOSPITAL ENCOUNTER (OUTPATIENT)
Facility: CLINIC | Age: 32
End: 2022-08-12
Admitting: OBSTETRICS & GYNECOLOGY
Payer: OTHER GOVERNMENT

## 2022-08-12 ENCOUNTER — HOSPITAL ENCOUNTER (OUTPATIENT)
Facility: CLINIC | Age: 32
Discharge: HOME | End: 2022-08-12
Attending: OBSTETRICS & GYNECOLOGY | Admitting: OBSTETRICS & GYNECOLOGY
Payer: OTHER GOVERNMENT

## 2022-08-12 VITALS
SYSTOLIC BLOOD PRESSURE: 145 MMHG | OXYGEN SATURATION: 97 % | DIASTOLIC BLOOD PRESSURE: 100 MMHG | WEIGHT: 227 LBS | TEMPERATURE: 98.3 F | RESPIRATION RATE: 16 BRPM | HEIGHT: 63 IN | HEART RATE: 112 BPM | BODY MASS INDEX: 40.22 KG/M2

## 2022-08-12 PROBLEM — Z36.89 ENCOUNTER FOR TRIAGE IN PREGNANT PATIENT: Status: ACTIVE | Noted: 2022-08-12

## 2022-08-12 LAB
ABO/RH(D): NORMAL
ALBUMIN MFR UR ELPH: <7 MG/DL
ALT SERPL W P-5'-P-CCNC: 11 U/L (ref 0–45)
ANTIBODY SCREEN: NEGATIVE
AST SERPL W P-5'-P-CCNC: 12 U/L (ref 0–40)
CREAT SERPL-MCNC: 0.66 MG/DL (ref 0.6–1.1)
CREAT UR-MCNC: 52 MG/DL
ERYTHROCYTE [DISTWIDTH] IN BLOOD BY AUTOMATED COUNT: 14.6 % (ref 10–15)
GFR SERPL CREATININE-BSD FRML MDRD: >90 ML/MIN/1.73M2
HCT VFR BLD AUTO: 36.8 % (ref 35–47)
HGB BLD-MCNC: 12.6 G/DL (ref 11.7–15.7)
MCH RBC QN AUTO: 30.7 PG (ref 26.5–33)
MCHC RBC AUTO-ENTMCNC: 34.2 G/DL (ref 31.5–36.5)
MCV RBC AUTO: 90 FL (ref 78–100)
PLATELET # BLD AUTO: 181 10E3/UL (ref 150–450)
PROT/CREAT 24H UR: NORMAL MG/G{CREAT}
RBC # BLD AUTO: 4.1 10E6/UL (ref 3.8–5.2)
SPECIMEN EXPIRATION DATE: NORMAL
WBC # BLD AUTO: 8.1 10E3/UL (ref 4–11)

## 2022-08-12 PROCEDURE — 84460 ALANINE AMINO (ALT) (SGPT): CPT | Performed by: OBSTETRICS & GYNECOLOGY

## 2022-08-12 PROCEDURE — 84156 ASSAY OF PROTEIN URINE: CPT | Performed by: OBSTETRICS & GYNECOLOGY

## 2022-08-12 PROCEDURE — 84450 TRANSFERASE (AST) (SGOT): CPT | Performed by: OBSTETRICS & GYNECOLOGY

## 2022-08-12 PROCEDURE — 36415 COLL VENOUS BLD VENIPUNCTURE: CPT | Performed by: OBSTETRICS & GYNECOLOGY

## 2022-08-12 PROCEDURE — 85027 COMPLETE CBC AUTOMATED: CPT | Performed by: OBSTETRICS & GYNECOLOGY

## 2022-08-12 PROCEDURE — G0463 HOSPITAL OUTPT CLINIC VISIT: HCPCS

## 2022-08-12 PROCEDURE — 82565 ASSAY OF CREATININE: CPT | Performed by: OBSTETRICS & GYNECOLOGY

## 2022-08-12 PROCEDURE — 86850 RBC ANTIBODY SCREEN: CPT | Performed by: OBSTETRICS & GYNECOLOGY

## 2022-08-12 RX ORDER — LIDOCAINE 40 MG/G
CREAM TOPICAL
Status: DISCONTINUED | OUTPATIENT
Start: 2022-08-12 | End: 2022-08-12 | Stop reason: HOSPADM

## 2022-08-12 RX ORDER — SODIUM CHLORIDE, SODIUM LACTATE, POTASSIUM CHLORIDE, CALCIUM CHLORIDE 600; 310; 30; 20 MG/100ML; MG/100ML; MG/100ML; MG/100ML
10-125 INJECTION, SOLUTION INTRAVENOUS CONTINUOUS
Status: DISCONTINUED | OUTPATIENT
Start: 2022-08-12 | End: 2022-08-12 | Stop reason: HOSPADM

## 2022-08-12 ASSESSMENT — ACTIVITIES OF DAILY LIVING (ADL)
ADLS_ACUITY_SCORE: 31
ADLS_ACUITY_SCORE: 35

## 2022-08-12 NOTE — DISCHARGE INSTRUCTIONS
OB Triage Discharge Instructions    Diet:  Regular diet as tolerated    Activity:  As tolerated    Other Special Instructions: Please call [facility] Labor and Delivery on Sunday 8/14/2022 around 2:30-2:45 pm to see if your induction will occur as scheduled. Plan on arriving to the labor and delivery unit at 4PM.      Reason for being seen in L&D: Elevated blood pressures.     Treatment/procedures performed in L&D: Serial blood pressures, HELLP panel.     Call the [facility] at  [phone] if you have:  5 or more contractions in one hour  Leaking of fluid from your vaginal area  Decreased fetal movement (follow kick count instructions)  Bleeding from your vaginal area  Swelling in your face, or increased swelling in your hands or legs  Headaches or vision problems such as blurring or seeing spots or starts  Nausea or vomiting lasting for more than 24 hours  An increase in weight (5lbs/week)  Epigastric pain (sometimes confused with heartburn that does not go away or a very bad stomach ache)    If you have any questions, please follow up with your doctor.      Method of discharge: ambulatory   Accompanied by: self  Time of discharge: 1225

## 2022-08-15 ENCOUNTER — ANESTHESIA (OUTPATIENT)
Dept: OBGYN | Facility: CLINIC | Age: 32
End: 2022-08-15
Payer: OTHER GOVERNMENT

## 2022-08-15 ENCOUNTER — ANESTHESIA EVENT (OUTPATIENT)
Dept: OBGYN | Facility: CLINIC | Age: 32
End: 2022-08-15
Payer: OTHER GOVERNMENT

## 2022-08-15 PROBLEM — O98.512 COVID-19 AFFECTING PREGNANCY IN SECOND TRIMESTER: Status: ACTIVE | Noted: 2022-08-15

## 2022-08-15 PROBLEM — U07.1 COVID-19 AFFECTING PREGNANCY IN SECOND TRIMESTER: Status: ACTIVE | Noted: 2022-08-15

## 2022-08-15 PROBLEM — O99.213 OBESITY AFFECTING PREGNANCY IN THIRD TRIMESTER: Status: ACTIVE | Noted: 2022-08-15

## 2022-08-15 PROCEDURE — 370N000003 HC ANESTHESIA WARD SERVICE

## 2022-08-15 PROCEDURE — 250N000009 HC RX 250: Performed by: ANESTHESIOLOGY

## 2022-08-15 RX ORDER — LIDOCAINE HYDROCHLORIDE AND EPINEPHRINE 15; 5 MG/ML; UG/ML
INJECTION, SOLUTION EPIDURAL PRN
Status: DISCONTINUED | OUTPATIENT
Start: 2022-08-15 | End: 2022-08-15

## 2022-08-15 RX ADMIN — LIDOCAINE HYDROCHLORIDE,EPINEPHRINE BITARTRATE 5 ML: 15; .005 INJECTION, SOLUTION EPIDURAL; INFILTRATION; INTRACAUDAL; PERINEURAL at 09:18

## 2022-08-15 RX ADMIN — LIDOCAINE HYDROCHLORIDE,EPINEPHRINE BITARTRATE 3 ML: 15; .005 INJECTION, SOLUTION EPIDURAL; INFILTRATION; INTRACAUDAL; PERINEURAL at 09:15

## 2022-08-15 NOTE — ANESTHESIA PROCEDURE NOTES
Epidural catheter Procedure Note    Pre-Procedure   Staff -        Anesthesiologist:  Chino Ortiz MD       Performed By: anesthesiologist       Location: OB       Procedure Start/Stop Times: 8/15/2022 9:05 AM and 8/15/2022 9:27 AM       Pre-Anesthestic Checklist: patient identified, risks and benefits discussed, informed consent, monitors and equipment checked and pre-op evaluation  Timeout:       Correct Patient: Yes        Correct Procedure: Yes        Correct Site: Yes        Correct Position: Yes   Procedure Documentation  Procedure: epidural catheter       Diagnosis: LABOR PAIN       Patient Prep/Sterile Barriers: sterile gloves, mask, patient draped       Skin prep: Chloraprep       Local skin infiltrated with mL of 1% lidocaine.        Insertion Site: L3-4. (midline approach).       Technique: LORT saline        Needle Type: SignStorey       Needle Gauge: 18.        Needle Length (Inches): 3.5        Catheter: 20 G.          Catheter threaded easily.         6 cm epidural space.           # of attempts: 1 and  # of redirects:  0    Assessment/Narrative         Paresthesias: No.       Test dose of 3 mL lidocaine 1.5% w/ 1:200,000 epinephrine at 09:15 CDT.        .       Insertion/Infusion Method: LORT saline       Aspiration negative for Heme or CSF via Epidural Catheter.    Medication(s) Administered   0.125% Bupivacaine + 2 mcg/mL Fentanyl via CADD (Epidural) - EPIDURAL   10 mL - 8/15/2022 9:29:00 AM  Medication Administration Time: 8/15/2022 9:05 AM

## 2022-08-15 NOTE — ANESTHESIA PREPROCEDURE EVALUATION
Anesthesia Pre-Procedure Evaluation    Patient: Olivia Jacobson   MRN: [id number] : 1990        Procedure : * No procedures listed *  Cervical Ripening       Past Medical History:   Diagnosis Date     Elevated hemoglobin A1c      Fatty liver      HTN (hypertension)      Lupus nephritis (H)      Obesity      Systemic lupus erythematosus (H)       Past Surgical History:   Procedure Laterality Date     DILATION AND CURETTAGE       right ovary removal        Allergies   Allergen Reactions     Plaquenil [Hydroxychloroquine]       Social History     Tobacco Use     Smoking status: Never Smoker     Smokeless tobacco: Never Used   Substance Use Topics     Alcohol use: Not Currently      Wt Readings from Last 1 Encounters:   22 103 kg (227 lb)        Anesthesia Evaluation            ROS/MED HX  ENT/Pulmonary:  - neg pulmonary ROS     Neurologic:  - neg neurologic ROS     Cardiovascular:     (+) hypertension-----    METS/Exercise Tolerance:     Hematologic:  - neg hematologic  ROS     Musculoskeletal:  - neg musculoskeletal ROS     GI/Hepatic:     (+) liver disease,     Renal/Genitourinary:     (+) renal disease,     Endo:     (+) Obesity (bmi 40),     Psychiatric/Substance Use:  - neg psychiatric ROS     Infectious Disease:  - neg infectious disease ROS     Malignancy:  - neg malignancy ROS     Other:      (+) Possibly pregnant, ,         Physical Exam    Airway        Mallampati: II   TM distance: > 3 FB   Neck ROM: full   Mouth opening: > 3 cm    Respiratory Devices and Support         Dental  no notable dental history         Cardiovascular             Pulmonary                   OUTSIDE LABS:  CBC:   Lab Results   Component Value Date    WBC 7.5 2022    WBC 8.1 2022    HGB 12.6 2022    HGB 12.6 2022    HCT 36.6 2022    HCT 36.8 2022     2022     2022     BMP:   Lab Results   Component Value Date    CR 0.70 2022    CR 0.66 2022      COAGS: No results found for: PTT, INR, FIBR  POC: No results found for: BGM, HCG, HCGS  HEPATIC:   Lab Results   Component Value Date    ALT <9 08/14/2022    AST 12 08/14/2022     OTHER: No results found for: PH, LACT, A1C, RITO, PHOS, MAG, LIPASE, AMYLASE, TSH, T4, T3, CRP, SED    Anesthesia Plan    ASA Status:  3      Anesthesia Type: Epidural.              Consents    Anesthesia Plan(s) and associated risks, benefits, and realistic alternatives discussed. Questions answered and patient/representative(s) expressed understanding.    - Discussed:     - Discussed with:  Patient         Postoperative Care            Comments:                Chino Ortiz MD

## 2022-08-16 NOTE — ANESTHESIA POSTPROCEDURE EVALUATION
Patient: Olivia Jacobson    Procedure: * No procedures listed *  Cervical Ripening    Anesthesia Type:  Epidural    Note:  Disposition: Inpatient   Postop Pain Control: Uneventful            Sign Out: Well controlled pain   PONV: No   Neuro/Psych: Uneventful            Sign Out: Acceptable/Baseline neuro status   Airway/Respiratory: Uneventful            Sign Out: Acceptable/Baseline resp. status   CV/Hemodynamics: Uneventful            Sign Out: Acceptable CV status; No obvious hypovolemia; No obvious fluid overload   Other NRE: NONE   DID A NON-ROUTINE EVENT OCCUR? No           Last vitals:  Vitals:    08/15/22 2101 08/15/22 2106 08/15/22 2111   BP:  114/66    Resp:      Temp:      SpO2: 97% 97% 97%       Electronically Signed By: Chino Ortiz MD  August 15, 2022  10:03 PM

## 2022-09-10 ENCOUNTER — HEALTH MAINTENANCE LETTER (OUTPATIENT)
Age: 32
End: 2022-09-10

## 2023-04-30 ENCOUNTER — HEALTH MAINTENANCE LETTER (OUTPATIENT)
Age: 33
End: 2023-04-30

## 2024-07-07 ENCOUNTER — HEALTH MAINTENANCE LETTER (OUTPATIENT)
Age: 34
End: 2024-07-07

## 2025-07-19 ENCOUNTER — HEALTH MAINTENANCE LETTER (OUTPATIENT)
Age: 35
End: 2025-07-19